# Patient Record
Sex: FEMALE | Race: WHITE
[De-identification: names, ages, dates, MRNs, and addresses within clinical notes are randomized per-mention and may not be internally consistent; named-entity substitution may affect disease eponyms.]

---

## 2019-10-19 NOTE — HP
PRIMARY CARE DOCTOR:  Oneil Shultz DO



CHIEF COMPLAINT:  Mental status change.



HISTORY OF PRESENT ILLNESS:  An 79-year-old female with past medical history

significant for anxiety and depression with psychotic tendencies, brought in by

family due to mental status change of avoiding social contact and isolating herself

safe in the dark with paranoid tendencies.  The patient also has not been taking her

medications.  She reportedly flushed it in the toilet.  She could not open the door

for them and they had to enter through the window to get her to the hospital.  The

patient herself denied any problem.  She denied dysuria, hematuria, abdominal pain,

nausea, vomiting, chest pain, shortness of breath, headache, fever, chills, leg

swelling, or change in bowel habit.  She, however, admitted to constipation with

last bowel movement being two days ago.  The patient reportedly has had similar

problem and was admitted at the psychiatric facility.  Further evaluation in the ER

showed features of UTI on urinalysis, hence the patient was started on antibiotics.

Son at the bedside reported the patient's mentation is better today.  The patient

still does not have any complaints and does not really know why she is in the

hospital.  At baseline, the patient walks, ambulates with a walker, and denied

worsening pain. 



PAST MEDICAL HISTORY:  

1. Chronic low back pain.

2. Obesity.

3. Degenerative joint disease.

4. Anxiety and depression.

5. Prior history of psychosis.



PAST SURGICAL HISTORY:  

1. Right knee surgery.

2. Cholecystectomy.

3. Knee replacement.

4. Cataract surgery.

5. Left upper limb surgery.



FAMILY HISTORY:  Reviewed, but noncontributory.  Dad had diabetes mellitus.



SOCIAL HISTORY:  The patient lives alone.  At baseline, she ambulates with a walker.

 She denied smoking, alcohol use, or recreational drug use.  The patient wants to be

full code.  __________ surrogate decision makers with a son in [place] being the

durable medical power of .  The name of the surrogate decision maker is

Ramón Wilder. 



ALLERGIES:  CODEINE CAUSES NAUSEA, BUT OTHERWISE NO KNOWN DRUG ALLERGIES REPORTED.



PRIOR TO HOSPITAL MEDICATIONS:  

1. Divalproex sodium 250 mg p.o. daily.

2. Seroquel 25 mg p.o. daily at bedtime.



REVIEW OF SYSTEMS:  A 12-point review of system performed was negative, other than

pertinent positives and negatives included in the history of present illness. 



PHYSICAL EXAMINATION:

VITAL SIGNS:  Temperature 98.5, pulse 97, respiratory rate 18, SpO2 of 95% on room

air, blood pressure is 142/70. 

GENERAL:  Obese elderly female, in no obvious distress.  Afebrile.  Anicteric.

Acyanotic. 

HEENT:  Normocephalic, atraumatic.  Oral mucosa is moist. 

NECK:  Supple.  Nontender with good range of motion. 

CARDIOVASCULAR:  Regular rhythm and rate with normal heart sounds one and two.  No

obvious murmur was appreciated. 

RESPIRATORY:  Good air entry bilaterally with no obvious crackle or rhonchi. 

GASTROINTESTINAL:  Obese, soft, nontender, nondistended with normal bowel sounds. 

EXTREMITIES:  Grossly normal looking atraumatic with no edema or erythema. 

CENTRAL NERVOUS SYSTEM:  Conscious and alert, and oriented x4 with appropriate

mental status.  The patient is very cooperative and conversational.  She moves all

extremities.  Cranial nerves 2 through 12 are grossly intact. 

PSYCHIATRIC:  Normal affect.  She has no insight as to why she is in the hospital.

Reports that she feels good and thinks that the DA wanted her to come to the

hospital or planning to steal her things. 



DIAGNOSTIC DATA:  CBC performed on October 18, showed WBC count of 9.3, hemoglobin

of 15.1, MCV of 92.3, and platelet of 264. 



CMP performed on October 18, showed sodium 138, potassium 3.8, chloride 106, CO2 of

23, BUN 13, creatinine 0.89, glucose 146, calcium 9.0, total bilirubin 0.5, AST 16,

ALT 11, alkaline phosphatase 112.  Total protein 6.9, albumin 3.7, globulin 3.2. 



Initial troponin on October 18, was 0.015. 



Urinalysis on October 18, showed yellow turbid urine with pH of 7.5, specific

gravity of 1.024.  Urine protein of 50 mg/dL, trace blood, positive nitrite and

leukocyte esterase of 500 mg/dL. 



Microscopy showed 0 to 3 rbc and greater than 50 wbc with 4+ bacteria. 



Urine drug screen performed on October 18, was unremarkable. 



EKG showed normal sinus rhythm with rate of 96.



ASSESSMENT:  

1. Acute metabolic encephalopathy due to urinary tract infection.

2. Urinary tract infection.

3. Physical deconditioning.

4. Bipolar with psychotic tendencies.  Very paranoid.



PLAN:  

1. Start antibiotic therapy with Rocephin 1 g IV daily.

2. Gobler oral intake.

3. PT/OT to evaluate and treat.

4. Restart psychiatric medications.

5. DVT prophylaxis with Lovenox.

6. Code status:  Full code.  Son is the surrogate decision maker.

7. Anticipate mental health evaluation prior to discharge.  Further treatment to

follow depending on hospital course. 





Job ID:  [id number]

## 2019-10-19 NOTE — EKG
Test Reason : 

Blood Pressure : ***/*** mmHG

Vent. Rate : 096 BPM     Atrial Rate : 096 BPM

   P-R Int : 192 ms          QRS Dur : 080 ms

    QT Int : 362 ms       P-R-T Axes : 081 -56 075 degrees

   QTc Int : 457 ms

 

Normal sinus rhythm

Possible Left atrial enlargement

Left axis deviation

Inferior infarct , age undetermined

Anterior infarct , age undetermined

Abnormal ECG

 

Confirmed by LELAND THEODORE, LUANNE ([id number]),  PATRICK MALONE ([id number]) on 10/19/2019 2:29:38 PM

 

Referred By:             Confirmed By:LUANNE HA MD

## 2019-10-20 NOTE — PDOC.HOSPP
- Subjective


Encounter Date: 10/20/19


Encounter Time: 16:02


Subjective: 





Feeling better. Close to baseline. Still thinks that the deputy Shalonda wants 

to steal her chair.





- Objective


Vital Signs & Weight: 


 Vital Signs (12 hours)











  Temp Pulse Resp BP Pulse Ox


 


 10/20/19 16:00  98.7 F  91  18  109/67  91 L


 


 10/20/19 11:54  98.2 F  88  19  132/70  96








 Weight











Admit Weight                   190 lb


 


Weight                         190 lb














I&O: 


 











 10/19/19 10/20/19 10/21/19





 06:59 06:59 06:59


 


Intake Total 265 250 


 


Balance 265 250 











Result Diagrams: 


 10/20/19 05:13





 10/20/19 05:13





- Exam


General Appearance: awake alert


Eye: anicteric sclera


ENT: normocephalic atraumatic


Neck: supple, symmetric, no JVD


Heart: RRR


Respiratory: no wheezes, no ronchi, normal chest expansion


Gastrointestinal: soft, non-tender, non-distended, normal bowel sounds


Extremities: no cyanosis


Neurological: cranial nerve grossly intact, no focal deficits


Psychiatric: A&O x 3





Hosp A/P


(1) Acute metabolic encephalopathy


Code(s): G93.41 - METABOLIC ENCEPHALOPATHY   Status: Acute   





(2) Proteus infection


Code(s): A49.8 - OTHER BACTERIAL INFECTIONS OF UNSPECIFIED SITE   Status: Acute

   





(3) UTI (urinary tract infection)


Status: Acute   





(4) Bipolar disorder with psychotic features


Code(s): F31.9 - BIPOLAR DISORDER, UNSPECIFIED   Status: Acute   





- Plan





Change antibiotic to oral omnicef in line with susceptibility test.


Consult [facility]


Patient can be discharged from medical point of view.


Update provided to patient and relatives.

## 2019-10-21 NOTE — DIS
DATE OF ADMISSION:  10/18/2019



DATE OF DISCHARGE:  10/20/2019



PRIMARY CARE PHYSICIAN:  Dr. Oneil Shultz.



DISCHARGE DIAGNOSES:  

1. Klebsiella pneumoniae urinary tract infection.

2. Acute metabolic encephalopathy.

3. Bipolar disorder with psychotic features.

4. Obesity.

5. Degenerative joint disease.

6. Physical deconditioning.



HOSPITAL COURSE:  An 79-year-old female with known history of anxiety and depression

with psychotic tendencies, brought in by family due to mental status change of

avoiding social contact and isolating herself in the dark with paranoid tendencies.

Evaluation revealed features of urinary tract infection on UA.  The patient was

subsequently admitted and started on IV antibiotics.  Mental status improved.  Urine

culture grew Klebsiella pneumoniae and antibiotics was adjusted in-line with

susceptibility.  Mental health evaluation was obtained and following discussion, the

patient voluntarily agreed for psychiatric treatment.  She was subsequently

discharged to inpatient psych for further treatment. 



DISCHARGE CONDITION:  Improved.



DISCHARGE MEDICATIONS:  

1. Divalproex sodium 250 mg p.o. daily.

2. Seroquel 25 mg p.o. daily at bedtime.

3. Acetaminophen 650 mg q.6 p.r.n. for pain.

4. Omnicef 300 mg p.o. b.i.d. for 5 days.

5. MiraLAX 17 g p.o. daily.

6. Sennosides/docusate two tablets p.o. b.i.d. p.r.n. for constipation.



TIME SPENT:  Discharge took more than 35 minutes.







Job ID:  [id number] pain, back/back and leg pain

## 2020-03-11 NOTE — ULT
BILATERAL RENAL ULTRASOUND:

 

HISTORY: 

Recurrent UTIs.

 

FINDINGS: 

The right kidney measures 11.2 cm in length and the left kidney measures 11.9 cm in length.  No hydro
nephrosis is seen on either side.  There is a 1.8 x 1.5 x 1.5 cm cyst in the lateral aspect of the ri
ght mid kidney.  The urinary bladder is unremarkable.

 

IMPRESSION: 

Right renal cyst.

 

POS: [facility]

## 2020-04-23 NOTE — RAD
Chest one view



HISTORY: Chest and abdomen pain.



FINDINGS: Cardiac silhouette and pulmonary vasculature are unremarkable. Mediastinum is midline allow
ing for slight rightward convex curvature of the thoracic spine.



No lobar consolidation or evidence of pneumothorax. There are degenerative changes of the thoracic sp
ine and shoulders.



Cardiac monitor leads overlie the chest.



  



IMPRESSION :

No active cardiopulmonary abnormalities are demonstrated.



Reported By: JAMES Bell 

Electronically Signed:  4/23/2020 12:31 PM

## 2020-04-29 NOTE — EKG
Test Reason : 

Blood Pressure : ***/*** mmHG

Vent. Rate : 072 BPM     Atrial Rate : 072 BPM

   P-R Int : 196 ms          QRS Dur : 074 ms

    QT Int : 390 ms       P-R-T Axes : 077 -75 084 degrees

   QTc Int : 427 ms

 

Normal sinus rhythm

Left axis deviation

No STEMI

Abnormal ECG

 

Confirmed by SYBIL PERLA M.D. ([id number]),  RADAMES LOTT ([id number]) on 4/29/2020 3:15:58 PM

 

Referred By:             Confirmed By:SYBIL PERLA M.D.

## 2020-12-04 NOTE — RAD
LUMBAR SPINE FOUR VIEWS:

12/4/20

 

HISTORY: 

Lumbar spondylosis. Right sided lower back pain. 

 

FINDINGS/IMPRESSION: 

Degenerative changes are present. There is grade I anterolisthesis of L4 over L5 without significant 
change in alignment on flexion or extension. No fracture or bony destruction identified. 

 

POS: [facility]

## 2021-12-09 ENCOUNTER — HOSPITAL ENCOUNTER (OUTPATIENT)
Dept: HOSPITAL 92 - SCSMRI | Age: 81
Discharge: HOME | End: 2021-12-09
Attending: NURSE PRACTITIONER
Payer: MEDICARE

## 2021-12-09 DIAGNOSIS — R41.0: Primary | ICD-10-CM

## 2021-12-09 DIAGNOSIS — I67.82: ICD-10-CM

## 2021-12-09 LAB — ESTIMATED GFR-MDRD - POC: 60

## 2021-12-09 PROCEDURE — 82565 ASSAY OF CREATININE: CPT

## 2021-12-09 PROCEDURE — 70553 MRI BRAIN STEM W/O & W/DYE: CPT

## 2022-02-01 ENCOUNTER — HOSPITAL ENCOUNTER (OUTPATIENT)
Dept: HOSPITAL 92 - RAD | Age: 82
Discharge: HOME | End: 2022-02-01
Attending: INTERNAL MEDICINE
Payer: MEDICARE

## 2022-02-01 DIAGNOSIS — R06.00: Primary | ICD-10-CM

## 2022-02-01 PROCEDURE — 71046 X-RAY EXAM CHEST 2 VIEWS: CPT

## 2022-02-22 ENCOUNTER — HOSPITAL ENCOUNTER (INPATIENT)
Dept: HOSPITAL 92 - ERS | Age: 82
LOS: 4 days | Discharge: HOME | DRG: 375 | End: 2022-02-26
Attending: INTERNAL MEDICINE | Admitting: INTERNAL MEDICINE
Payer: MEDICARE

## 2022-02-22 VITALS — BODY MASS INDEX: 34.9 KG/M2

## 2022-02-22 DIAGNOSIS — C20: Primary | ICD-10-CM

## 2022-02-22 DIAGNOSIS — K63.5: ICD-10-CM

## 2022-02-22 DIAGNOSIS — Z79.899: ICD-10-CM

## 2022-02-22 DIAGNOSIS — F31.9: ICD-10-CM

## 2022-02-22 DIAGNOSIS — Z20.822: ICD-10-CM

## 2022-02-22 DIAGNOSIS — N18.2: ICD-10-CM

## 2022-02-22 DIAGNOSIS — Z90.49: ICD-10-CM

## 2022-02-22 DIAGNOSIS — D62: ICD-10-CM

## 2022-02-22 DIAGNOSIS — Z80.0: ICD-10-CM

## 2022-02-22 DIAGNOSIS — K57.30: ICD-10-CM

## 2022-02-22 DIAGNOSIS — Z98.49: ICD-10-CM

## 2022-02-22 DIAGNOSIS — K62.89: ICD-10-CM

## 2022-02-22 DIAGNOSIS — F41.9: ICD-10-CM

## 2022-02-22 DIAGNOSIS — D12.6: ICD-10-CM

## 2022-02-22 DIAGNOSIS — E66.9: ICD-10-CM

## 2022-02-22 LAB
ALBUMIN SERPL BCG-MCNC: 3.6 G/DL (ref 3.4–4.8)
ALP SERPL-CCNC: 118 U/L (ref 40–110)
ALT SERPL W P-5'-P-CCNC: 7 U/L (ref 8–55)
ANION GAP SERPL CALC-SCNC: 12 MMOL/L (ref 10–20)
APTT PPP: 31.9 SEC (ref 22.9–36.1)
AST SERPL-CCNC: 13 U/L (ref 5–34)
BASOPHILS # BLD AUTO: 0.1 THOU/UL (ref 0–0.2)
BASOPHILS NFR BLD AUTO: 0.7 % (ref 0–1)
BILIRUB SERPL-MCNC: 0.5 MG/DL (ref 0.2–1.2)
BUN SERPL-MCNC: 11 MG/DL (ref 9.8–20.1)
CALCIUM SERPL-MCNC: 8.8 MG/DL (ref 7.8–10.44)
CHLORIDE SERPL-SCNC: 104 MMOL/L (ref 98–107)
CO2 SERPL-SCNC: 27 MMOL/L (ref 23–31)
CREAT CL PREDICTED SERPL C-G-VRATE: 0 ML/MIN (ref 70–130)
EOSINOPHIL # BLD AUTO: 0.3 THOU/UL (ref 0–0.7)
EOSINOPHIL NFR BLD AUTO: 2.9 % (ref 0–10)
GLOBULIN SER CALC-MCNC: 3.5 G/DL (ref 2.4–3.5)
GLUCOSE SERPL-MCNC: 112 MG/DL (ref 83–110)
HGB BLD-MCNC: 13.9 G/DL (ref 12–16)
HGB BLD-MCNC: 14.8 G/DL (ref 12–16)
INR PPP: 1
LYMPHOCYTES # BLD: 3.4 THOU/UL (ref 1.2–3.4)
LYMPHOCYTES NFR BLD AUTO: 34.2 % (ref 21–51)
MCH RBC QN AUTO: 28.6 PG (ref 27–31)
MCV RBC AUTO: 92.5 FL (ref 78–98)
MONOCYTES # BLD AUTO: 0.9 THOU/UL (ref 0.11–0.59)
MONOCYTES NFR BLD AUTO: 9.4 % (ref 0–10)
NEUTROPHILS # BLD AUTO: 5.3 THOU/UL (ref 1.4–6.5)
NEUTROPHILS NFR BLD AUTO: 52.8 % (ref 42–75)
PLATELET # BLD AUTO: 344 THOU/UL (ref 130–400)
POTASSIUM SERPL-SCNC: 4.3 MMOL/L (ref 3.5–5.1)
PROTHROMBIN TIME: 13 SEC (ref 12–14.7)
RBC # BLD AUTO: 5.17 MILL/UL (ref 4.2–5.4)
SODIUM SERPL-SCNC: 139 MMOL/L (ref 136–145)
WBC # BLD AUTO: 10 THOU/UL (ref 4.8–10.8)

## 2022-02-22 PROCEDURE — 36416 COLLJ CAPILLARY BLOOD SPEC: CPT

## 2022-02-22 PROCEDURE — 71260 CT THORAX DX C+: CPT

## 2022-02-22 PROCEDURE — 85018 HEMOGLOBIN: CPT

## 2022-02-22 PROCEDURE — 86901 BLOOD TYPING SEROLOGIC RH(D): CPT

## 2022-02-22 PROCEDURE — 82378 CARCINOEMBRYONIC ANTIGEN: CPT

## 2022-02-22 PROCEDURE — 36415 COLL VENOUS BLD VENIPUNCTURE: CPT

## 2022-02-22 PROCEDURE — 85049 AUTOMATED PLATELET COUNT: CPT

## 2022-02-22 PROCEDURE — 96376 TX/PRO/DX INJ SAME DRUG ADON: CPT

## 2022-02-22 PROCEDURE — C1776 JOINT DEVICE (IMPLANTABLE): HCPCS

## 2022-02-22 PROCEDURE — 86850 RBC ANTIBODY SCREEN: CPT

## 2022-02-22 PROCEDURE — 85014 HEMATOCRIT: CPT

## 2022-02-22 PROCEDURE — 85610 PROTHROMBIN TIME: CPT

## 2022-02-22 PROCEDURE — 96374 THER/PROPH/DIAG INJ IV PUSH: CPT

## 2022-02-22 PROCEDURE — G0378 HOSPITAL OBSERVATION PER HR: HCPCS

## 2022-02-22 PROCEDURE — 88305 TISSUE EXAM BY PATHOLOGIST: CPT

## 2022-02-22 PROCEDURE — 80053 COMPREHEN METABOLIC PANEL: CPT

## 2022-02-22 PROCEDURE — 82274 ASSAY TEST FOR BLOOD FECAL: CPT

## 2022-02-22 PROCEDURE — U0005 INFEC AGEN DETEC AMPLI PROBE: HCPCS

## 2022-02-22 PROCEDURE — 74177 CT ABD & PELVIS W/CONTRAST: CPT

## 2022-02-22 PROCEDURE — 93005 ELECTROCARDIOGRAM TRACING: CPT

## 2022-02-22 PROCEDURE — 86900 BLOOD TYPING SEROLOGIC ABO: CPT

## 2022-02-22 PROCEDURE — 85025 COMPLETE CBC W/AUTO DIFF WBC: CPT

## 2022-02-22 PROCEDURE — U0003 INFECTIOUS AGENT DETECTION BY NUCLEIC ACID (DNA OR RNA); SEVERE ACUTE RESPIRATORY SYNDROME CORONAVIRUS 2 (SARS-COV-2) (CORONAVIRUS DISEASE [COVID-19]), AMPLIFIED PROBE TECHNIQUE, MAKING USE OF HIGH THROUGHPUT TECHNOLOGIES AS DESCRIBED BY CMS-2020-01-R: HCPCS

## 2022-02-22 PROCEDURE — 85730 THROMBOPLASTIN TIME PARTIAL: CPT

## 2022-02-22 PROCEDURE — C9113 INJ PANTOPRAZOLE SODIUM, VIA: HCPCS

## 2022-02-22 RX ADMIN — Medication PRN ML: at 17:53

## 2022-02-23 LAB
ALBUMIN SERPL BCG-MCNC: 3.2 G/DL (ref 3.4–4.8)
ALP SERPL-CCNC: 113 U/L (ref 40–110)
ALT SERPL W P-5'-P-CCNC: 8 U/L (ref 8–55)
ANION GAP SERPL CALC-SCNC: 14 MMOL/L (ref 10–20)
AST SERPL-CCNC: 12 U/L (ref 5–34)
BASOPHILS # BLD AUTO: 0.1 THOU/UL (ref 0–0.2)
BASOPHILS NFR BLD AUTO: 0.6 % (ref 0–1)
BILIRUB SERPL-MCNC: 0.8 MG/DL (ref 0.2–1.2)
BUN SERPL-MCNC: 8 MG/DL (ref 9.8–20.1)
CALCIUM SERPL-MCNC: 8.7 MG/DL (ref 7.8–10.44)
CHLORIDE SERPL-SCNC: 106 MMOL/L (ref 98–107)
CO2 SERPL-SCNC: 24 MMOL/L (ref 23–31)
CREAT CL PREDICTED SERPL C-G-VRATE: 89 ML/MIN (ref 70–130)
EOSINOPHIL # BLD AUTO: 0.2 THOU/UL (ref 0–0.7)
EOSINOPHIL NFR BLD AUTO: 2.5 % (ref 0–10)
GLOBULIN SER CALC-MCNC: 3.7 G/DL (ref 2.4–3.5)
GLUCOSE SERPL-MCNC: 122 MG/DL (ref 83–110)
HGB BLD-MCNC: 13.9 G/DL (ref 12–16)
LYMPHOCYTES # BLD: 2.8 THOU/UL (ref 1.2–3.4)
LYMPHOCYTES NFR BLD AUTO: 29.3 % (ref 21–51)
MCH RBC QN AUTO: 28.1 PG (ref 27–31)
MCV RBC AUTO: 92.5 FL (ref 78–98)
MONOCYTES # BLD AUTO: 0.9 THOU/UL (ref 0.11–0.59)
MONOCYTES NFR BLD AUTO: 9 % (ref 0–10)
NEUTROPHILS # BLD AUTO: 5.6 THOU/UL (ref 1.4–6.5)
NEUTROPHILS NFR BLD AUTO: 58.6 % (ref 42–75)
PLATELET # BLD AUTO: 331 THOU/UL (ref 130–400)
POTASSIUM SERPL-SCNC: 3.7 MMOL/L (ref 3.5–5.1)
RBC # BLD AUTO: 4.97 MILL/UL (ref 4.2–5.4)
SODIUM SERPL-SCNC: 140 MMOL/L (ref 136–145)
WBC # BLD AUTO: 9.5 THOU/UL (ref 4.8–10.8)

## 2022-02-23 PROCEDURE — 0DBH8ZZ EXCISION OF CECUM, VIA NATURAL OR ARTIFICIAL OPENING ENDOSCOPIC: ICD-10-PCS | Performed by: INTERNAL MEDICINE

## 2022-02-23 PROCEDURE — 0DBP8ZX EXCISION OF RECTUM, VIA NATURAL OR ARTIFICIAL OPENING ENDOSCOPIC, DIAGNOSTIC: ICD-10-PCS | Performed by: INTERNAL MEDICINE

## 2022-02-23 PROCEDURE — 0DBM8ZZ EXCISION OF DESCENDING COLON, VIA NATURAL OR ARTIFICIAL OPENING ENDOSCOPIC: ICD-10-PCS | Performed by: INTERNAL MEDICINE

## 2022-02-23 PROCEDURE — 0W3P8ZZ CONTROL BLEEDING IN GASTROINTESTINAL TRACT, VIA NATURAL OR ARTIFICIAL OPENING ENDOSCOPIC: ICD-10-PCS | Performed by: INTERNAL MEDICINE

## 2022-02-23 PROCEDURE — 0DBL8ZZ EXCISION OF TRANSVERSE COLON, VIA NATURAL OR ARTIFICIAL OPENING ENDOSCOPIC: ICD-10-PCS | Performed by: INTERNAL MEDICINE

## 2022-02-23 PROCEDURE — 0DBN8ZZ EXCISION OF SIGMOID COLON, VIA NATURAL OR ARTIFICIAL OPENING ENDOSCOPIC: ICD-10-PCS | Performed by: INTERNAL MEDICINE

## 2022-02-23 RX ADMIN — Medication PRN ML: at 08:03

## 2022-02-24 LAB
BASOPHILS # BLD AUTO: 0 THOU/UL (ref 0–0.2)
BASOPHILS NFR BLD AUTO: 0.2 % (ref 0–1)
EOSINOPHIL # BLD AUTO: 0.3 THOU/UL (ref 0–0.7)
EOSINOPHIL NFR BLD AUTO: 2.4 % (ref 0–10)
HGB BLD-MCNC: 13.9 G/DL (ref 12–16)
LYMPHOCYTES # BLD: 2.6 THOU/UL (ref 1.2–3.4)
LYMPHOCYTES NFR BLD AUTO: 23.7 % (ref 21–51)
MCH RBC QN AUTO: 29.6 PG (ref 27–31)
MCV RBC AUTO: 91.9 FL (ref 78–98)
MONOCYTES # BLD AUTO: 0.9 THOU/UL (ref 0.11–0.59)
MONOCYTES NFR BLD AUTO: 8.4 % (ref 0–10)
NEUTROPHILS # BLD AUTO: 7 THOU/UL (ref 1.4–6.5)
NEUTROPHILS NFR BLD AUTO: 65.3 % (ref 42–75)
PLATELET # BLD AUTO: 320 THOU/UL (ref 130–400)
RBC # BLD AUTO: 4.71 MILL/UL (ref 4.2–5.4)
WBC # BLD AUTO: 10.8 THOU/UL (ref 4.8–10.8)

## 2022-02-26 VITALS — SYSTOLIC BLOOD PRESSURE: 113 MMHG | TEMPERATURE: 98 F | DIASTOLIC BLOOD PRESSURE: 76 MMHG

## 2022-02-26 LAB
HGB BLD-MCNC: 13 G/DL (ref 12–16)
PLATELET # BLD AUTO: 319 THOU/UL (ref 130–400)

## 2022-03-03 ENCOUNTER — HOSPITAL ENCOUNTER (OUTPATIENT)
Dept: HOSPITAL 92 - PET | Age: 82
Discharge: HOME | End: 2022-03-03
Attending: INTERNAL MEDICINE
Payer: MEDICARE

## 2022-03-03 DIAGNOSIS — C20: Primary | ICD-10-CM

## 2022-03-03 PROCEDURE — 78815 PET IMAGE W/CT SKULL-THIGH: CPT

## 2022-03-03 PROCEDURE — A9552 F18 FDG: HCPCS

## 2022-05-07 ENCOUNTER — HOSPITAL ENCOUNTER (EMERGENCY)
Dept: HOSPITAL 92 - ERS | Age: 82
Discharge: HOME | End: 2022-05-07
Payer: MEDICARE

## 2022-05-07 DIAGNOSIS — K92.1: ICD-10-CM

## 2022-05-07 DIAGNOSIS — C18.9: ICD-10-CM

## 2022-05-07 DIAGNOSIS — T21.25XA: Primary | ICD-10-CM

## 2022-05-07 DIAGNOSIS — K62.89: ICD-10-CM
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
## 2022-05-07 DIAGNOSIS — T31.0: ICD-10-CM

## 2022-05-07 LAB
ALBUMIN SERPL BCG-MCNC: 3.4 G/DL (ref 3.4–4.8)
ALP SERPL-CCNC: 107 U/L (ref 40–110)
ALT SERPL W P-5'-P-CCNC: 11 U/L (ref 8–55)
ANION GAP SERPL CALC-SCNC: 14 MMOL/L (ref 10–20)
ANISOCYTOSIS BLD QL SMEAR: (no result) (100X)
AST SERPL-CCNC: 25 U/L (ref 5–34)
BILIRUB SERPL-MCNC: 0.5 MG/DL (ref 0.2–1.2)
BUN SERPL-MCNC: 10 MG/DL (ref 9.8–20.1)
CALCIUM SERPL-MCNC: 9.2 MG/DL (ref 7.8–10.44)
CHLORIDE SERPL-SCNC: 104 MMOL/L (ref 98–107)
CO2 SERPL-SCNC: 24 MMOL/L (ref 23–31)
CREAT CL PREDICTED SERPL C-G-VRATE: 0 ML/MIN (ref 70–130)
GLOBULIN SER CALC-MCNC: 3.8 G/DL (ref 2.4–3.5)
GLUCOSE SERPL-MCNC: 109 MG/DL (ref 83–110)
HGB BLD-MCNC: 13.1 G/DL (ref 12–16)
MCH RBC QN AUTO: 31.8 PG (ref 27–31)
MCV RBC AUTO: 97.1 FL (ref 78–98)
MDIFF COMPLETE?: YES
PLATELET # BLD AUTO: 269 THOU/UL (ref 130–400)
POTASSIUM SERPL-SCNC: 4 MMOL/L (ref 3.5–5.1)
RBC # BLD AUTO: 4.11 MILL/UL (ref 4.2–5.4)
SODIUM SERPL-SCNC: 138 MMOL/L (ref 136–145)
WBC # BLD AUTO: 4.7 THOU/UL (ref 4.8–10.8)

## 2022-05-07 PROCEDURE — 96374 THER/PROPH/DIAG INJ IV PUSH: CPT

## 2022-05-07 PROCEDURE — 80053 COMPREHEN METABOLIC PANEL: CPT

## 2022-05-07 PROCEDURE — 74177 CT ABD & PELVIS W/CONTRAST: CPT

## 2022-05-07 PROCEDURE — 96375 TX/PRO/DX INJ NEW DRUG ADDON: CPT

## 2022-05-07 PROCEDURE — 85025 COMPLETE CBC W/AUTO DIFF WBC: CPT
